# Patient Record
Sex: FEMALE | Race: WHITE | URBAN - METROPOLITAN AREA
[De-identification: names, ages, dates, MRNs, and addresses within clinical notes are randomized per-mention and may not be internally consistent; named-entity substitution may affect disease eponyms.]

---

## 2017-06-19 ENCOUNTER — TELEPHONE (OUTPATIENT)
Dept: FAMILY MEDICINE | Facility: CLINIC | Age: 22
End: 2017-06-19

## 2017-06-19 NOTE — TELEPHONE ENCOUNTER
Called Lisa and asked her she got the minocycline from cause she has not had a active RX from [facility].  She stated my mom.  Her mom is a CNP and works at a urgent care.  I told her she has not seen a provider for 3 years at  and needs to see someone if she is concerned about looking yellow or having labs done per .  She asked me if she could go to .  I told her I don't know if her mom's UC does those  Kind of labs she needs.  She could check  But I encouraged her to come in and see her PCP.  JESICA

## 2017-06-19 NOTE — TELEPHONE ENCOUNTER
Patient's mother called with concerns about liver functions.  Mom reports that patient restarted Minocycline 50mg daily in February.  Patient reports jaundice look for one week, reports that her eye are yellow and minocycline causes liver and jaundice concerns.  Patient's mother reports that she is a CNP and is requesting labs.    PCP is currently out of clinic, requesting covering provider.     Shi Riley RN

## 2017-10-01 ENCOUNTER — HEALTH MAINTENANCE LETTER (OUTPATIENT)
Age: 22
End: 2017-10-01

## 2017-12-17 ENCOUNTER — HEALTH MAINTENANCE LETTER (OUTPATIENT)
Age: 22
End: 2017-12-17